# Patient Record
Sex: FEMALE | URBAN - METROPOLITAN AREA
[De-identification: names, ages, dates, MRNs, and addresses within clinical notes are randomized per-mention and may not be internally consistent; named-entity substitution may affect disease eponyms.]

---

## 2023-01-12 ENCOUNTER — OFFICE VISIT (OUTPATIENT)
Dept: URBAN - METROPOLITAN AREA CLINIC 88 | Facility: CLINIC | Age: 43
End: 2023-01-12
Payer: MEDICAID

## 2023-01-12 DIAGNOSIS — H35.89 OTHER SPECIFIED RETINAL DISORDER: Primary | ICD-10-CM

## 2023-01-12 DIAGNOSIS — H52.223 REGULAR ASTIGMATISM, BILATERAL: ICD-10-CM

## 2023-01-12 PROCEDURE — 92004 COMPRE OPH EXAM NEW PT 1/>: CPT | Performed by: OPTOMETRIST

## 2023-01-12 ASSESSMENT — KERATOMETRY
OS: 42.50
OD: 42.38

## 2023-01-12 ASSESSMENT — VISUAL ACUITY
OD: 20/20
OS: 20/20

## 2023-01-12 ASSESSMENT — INTRAOCULAR PRESSURE
OD: 13
OS: 13

## 2023-01-12 NOTE — IMPRESSION/PLAN
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 Plan: Focal hypertrophy seen on OS today. Ed pt regarding condition. No need for treatment. Continue to monitor on regular intervals.